# Patient Record
Sex: MALE | Race: OTHER | NOT HISPANIC OR LATINO
[De-identification: names, ages, dates, MRNs, and addresses within clinical notes are randomized per-mention and may not be internally consistent; named-entity substitution may affect disease eponyms.]

---

## 2019-09-20 PROBLEM — Z00.00 ENCOUNTER FOR PREVENTIVE HEALTH EXAMINATION: Status: ACTIVE | Noted: 2019-09-20

## 2019-09-26 ENCOUNTER — APPOINTMENT (OUTPATIENT)
Dept: GASTROENTEROLOGY | Facility: CLINIC | Age: 49
End: 2019-09-26

## 2019-11-25 ENCOUNTER — APPOINTMENT (OUTPATIENT)
Dept: GASTROENTEROLOGY | Facility: CLINIC | Age: 49
End: 2019-11-25

## 2020-05-22 ENCOUNTER — EMERGENCY (EMERGENCY)
Facility: HOSPITAL | Age: 50
LOS: 1 days | Discharge: HOME | End: 2020-05-22
Attending: EMERGENCY MEDICINE | Admitting: EMERGENCY MEDICINE
Payer: COMMERCIAL

## 2020-05-22 VITALS
DIASTOLIC BLOOD PRESSURE: 78 MMHG | SYSTOLIC BLOOD PRESSURE: 125 MMHG | OXYGEN SATURATION: 97 % | TEMPERATURE: 98 F | RESPIRATION RATE: 16 BRPM | HEART RATE: 70 BPM

## 2020-05-22 VITALS
DIASTOLIC BLOOD PRESSURE: 93 MMHG | HEIGHT: 74 IN | WEIGHT: 210.1 LBS | TEMPERATURE: 98 F | RESPIRATION RATE: 18 BRPM | HEART RATE: 88 BPM | SYSTOLIC BLOOD PRESSURE: 145 MMHG | OXYGEN SATURATION: 95 %

## 2020-05-22 LAB
ALBUMIN SERPL ELPH-MCNC: 4.4 G/DL — SIGNIFICANT CHANGE UP (ref 3.3–5)
ALP SERPL-CCNC: 43 U/L — SIGNIFICANT CHANGE UP (ref 40–120)
ALT FLD-CCNC: 28 U/L — SIGNIFICANT CHANGE UP (ref 10–45)
ANION GAP SERPL CALC-SCNC: 11 MMOL/L — SIGNIFICANT CHANGE UP (ref 5–17)
APTT BLD: 25.6 SEC — LOW (ref 27.5–36.3)
AST SERPL-CCNC: 25 U/L — SIGNIFICANT CHANGE UP (ref 10–40)
BASOPHILS # BLD AUTO: 0.02 K/UL — SIGNIFICANT CHANGE UP (ref 0–0.2)
BASOPHILS NFR BLD AUTO: 0.3 % — SIGNIFICANT CHANGE UP (ref 0–2)
BILIRUB SERPL-MCNC: 1 MG/DL — SIGNIFICANT CHANGE UP (ref 0.2–1.2)
BUN SERPL-MCNC: 16 MG/DL — SIGNIFICANT CHANGE UP (ref 7–23)
CALCIUM SERPL-MCNC: 9.6 MG/DL — SIGNIFICANT CHANGE UP (ref 8.4–10.5)
CHLORIDE SERPL-SCNC: 100 MMOL/L — SIGNIFICANT CHANGE UP (ref 96–108)
CO2 SERPL-SCNC: 29 MMOL/L — SIGNIFICANT CHANGE UP (ref 22–31)
CREAT SERPL-MCNC: 1.17 MG/DL — SIGNIFICANT CHANGE UP (ref 0.5–1.3)
EOSINOPHIL # BLD AUTO: 0.02 K/UL — SIGNIFICANT CHANGE UP (ref 0–0.5)
EOSINOPHIL NFR BLD AUTO: 0.3 % — SIGNIFICANT CHANGE UP (ref 0–6)
GLUCOSE SERPL-MCNC: 143 MG/DL — HIGH (ref 70–99)
HCT VFR BLD CALC: 46.1 % — SIGNIFICANT CHANGE UP (ref 39–50)
HGB BLD-MCNC: 14.6 G/DL — SIGNIFICANT CHANGE UP (ref 13–17)
IMM GRANULOCYTES NFR BLD AUTO: 0.2 % — SIGNIFICANT CHANGE UP (ref 0–1.5)
INR BLD: 1.03 — SIGNIFICANT CHANGE UP (ref 0.88–1.16)
LYMPHOCYTES # BLD AUTO: 1.8 K/UL — SIGNIFICANT CHANGE UP (ref 1–3.3)
LYMPHOCYTES # BLD AUTO: 29.5 % — SIGNIFICANT CHANGE UP (ref 13–44)
MCHC RBC-ENTMCNC: 27.3 PG — SIGNIFICANT CHANGE UP (ref 27–34)
MCHC RBC-ENTMCNC: 31.7 GM/DL — LOW (ref 32–36)
MCV RBC AUTO: 86.3 FL — SIGNIFICANT CHANGE UP (ref 80–100)
MONOCYTES # BLD AUTO: 0.7 K/UL — SIGNIFICANT CHANGE UP (ref 0–0.9)
MONOCYTES NFR BLD AUTO: 11.5 % — SIGNIFICANT CHANGE UP (ref 2–14)
NEUTROPHILS # BLD AUTO: 3.55 K/UL — SIGNIFICANT CHANGE UP (ref 1.8–7.4)
NEUTROPHILS NFR BLD AUTO: 58.2 % — SIGNIFICANT CHANGE UP (ref 43–77)
NRBC # BLD: 0 /100 WBCS — SIGNIFICANT CHANGE UP (ref 0–0)
PLATELET # BLD AUTO: 223 K/UL — SIGNIFICANT CHANGE UP (ref 150–400)
POTASSIUM SERPL-MCNC: 3.9 MMOL/L — SIGNIFICANT CHANGE UP (ref 3.5–5.3)
POTASSIUM SERPL-SCNC: 3.9 MMOL/L — SIGNIFICANT CHANGE UP (ref 3.5–5.3)
PROT SERPL-MCNC: 7.2 G/DL — SIGNIFICANT CHANGE UP (ref 6–8.3)
PROTHROM AB SERPL-ACNC: 11.8 SEC — SIGNIFICANT CHANGE UP (ref 10–12.9)
RBC # BLD: 5.34 M/UL — SIGNIFICANT CHANGE UP (ref 4.2–5.8)
RBC # FLD: 12.2 % — SIGNIFICANT CHANGE UP (ref 10.3–14.5)
SARS-COV-2 RNA SPEC QL NAA+PROBE: SIGNIFICANT CHANGE UP
SODIUM SERPL-SCNC: 140 MMOL/L — SIGNIFICANT CHANGE UP (ref 135–145)
WBC # BLD: 6.1 K/UL — SIGNIFICANT CHANGE UP (ref 3.8–10.5)
WBC # FLD AUTO: 6.1 K/UL — SIGNIFICANT CHANGE UP (ref 3.8–10.5)

## 2020-05-22 PROCEDURE — 85610 PROTHROMBIN TIME: CPT

## 2020-05-22 PROCEDURE — 99291 CRITICAL CARE FIRST HOUR: CPT

## 2020-05-22 PROCEDURE — 85730 THROMBOPLASTIN TIME PARTIAL: CPT

## 2020-05-22 PROCEDURE — 80053 COMPREHEN METABOLIC PANEL: CPT

## 2020-05-22 PROCEDURE — 93005 ELECTROCARDIOGRAM TRACING: CPT

## 2020-05-22 PROCEDURE — 70496 CT ANGIOGRAPHY HEAD: CPT | Mod: 26

## 2020-05-22 PROCEDURE — 70498 CT ANGIOGRAPHY NECK: CPT | Mod: 26

## 2020-05-22 PROCEDURE — 99291 CRITICAL CARE FIRST HOUR: CPT | Mod: 25

## 2020-05-22 PROCEDURE — 36415 COLL VENOUS BLD VENIPUNCTURE: CPT

## 2020-05-22 PROCEDURE — 82962 GLUCOSE BLOOD TEST: CPT

## 2020-05-22 PROCEDURE — 0042T: CPT

## 2020-05-22 PROCEDURE — 70450 CT HEAD/BRAIN W/O DYE: CPT | Mod: 26,59

## 2020-05-22 PROCEDURE — 70551 MRI BRAIN STEM W/O DYE: CPT | Mod: 26

## 2020-05-22 PROCEDURE — U0003: CPT

## 2020-05-22 PROCEDURE — 93010 ELECTROCARDIOGRAM REPORT: CPT

## 2020-05-22 PROCEDURE — 87635 SARS-COV-2 COVID-19 AMP PRB: CPT

## 2020-05-22 PROCEDURE — 85025 COMPLETE CBC W/AUTO DIFF WBC: CPT

## 2020-05-22 PROCEDURE — 99284 EMERGENCY DEPT VISIT MOD MDM: CPT

## 2020-05-22 RX ORDER — DIAZEPAM 5 MG
5 TABLET ORAL ONCE
Refills: 0 | Status: DISCONTINUED | OUTPATIENT
Start: 2020-05-22 | End: 2020-05-22

## 2020-05-22 RX ORDER — DIAZEPAM 5 MG
1 TABLET ORAL
Qty: 9 | Refills: 0
Start: 2020-05-22 | End: 2020-05-24

## 2020-05-22 RX ORDER — MECLIZINE HCL 12.5 MG
25 TABLET ORAL ONCE
Refills: 0 | Status: COMPLETED | OUTPATIENT
Start: 2020-05-22 | End: 2020-05-22

## 2020-05-22 RX ORDER — SODIUM CHLORIDE 9 MG/ML
1000 INJECTION INTRAMUSCULAR; INTRAVENOUS; SUBCUTANEOUS ONCE
Refills: 0 | Status: COMPLETED | OUTPATIENT
Start: 2020-05-22 | End: 2020-05-22

## 2020-05-22 RX ORDER — MECLIZINE HCL 12.5 MG
25 TABLET ORAL ONCE
Refills: 0 | Status: DISCONTINUED | OUTPATIENT
Start: 2020-05-22 | End: 2020-05-22

## 2020-05-22 RX ORDER — MECLIZINE HCL 12.5 MG
1 TABLET ORAL
Qty: 21 | Refills: 0
Start: 2020-05-22 | End: 2020-05-28

## 2020-05-22 RX ADMIN — Medication 25 MILLIGRAM(S): at 12:55

## 2020-05-22 RX ADMIN — SODIUM CHLORIDE 1000 MILLILITER(S): 9 INJECTION INTRAMUSCULAR; INTRAVENOUS; SUBCUTANEOUS at 11:01

## 2020-05-22 RX ADMIN — Medication 5 MILLIGRAM(S): at 11:01

## 2020-05-22 NOTE — ED PROVIDER NOTE - CLINICAL SUMMARY MEDICAL DECISION MAKING FREE TEXT BOX
51 y/o M without PMHx presenting to the ED with c/o room-spinning dizziness and off-balance since 12PM yesterday with associated nausea, vomiting. Pt /93, generally well-appearing. Neuro exam normal except for unsteady gait and swaying during pronator drift test. Stroke code called at 10:17.

## 2020-05-22 NOTE — ED ADULT NURSE NOTE - OBJECTIVE STATEMENT
Pt c/o room spinning sensation that started 12pm yesterday while he was driving in the car w/ associated nausea that worsened this morning.  +ataxia on arrival.  Dizziness relieved when he is laying down.  Speaking clearly and coherently in full sentences.  Denies vision changes, head injury / trauma, chest pain, sob, cough, fever, chills, numbness.  Fs 143 on arrival.  Patient verbalized he is not on any medication and is otherwise healthy.  Stroke code initiated.  Team at bedside.  Cardiac monitoring, Neurochecks in place.  Patient in no acute distress.  Continue to monitor.

## 2020-05-22 NOTE — ED ADULT TRIAGE NOTE - AS TEMP SITE
Quality 110: Preventive Care And Screening: Influenza Immunization: Influenza Immunization not Administered because Patient Refused. oral Quality 130: Documentation Of Current Medications In The Medical Record: Current Medications Documented Detail Level: Detailed Quality 402: Tobacco Use And Help With Quitting Among Adolescents: Patient screened for tobacco and never smoked

## 2020-05-22 NOTE — ED ADULT NURSE REASSESSMENT NOTE - NS ED NURSE REASSESS COMMENT FT1
Patient verbalized he is feeling better, ambulated with steady gait to use the bathroom.  Pending MRI.  Continue to monitor.

## 2020-05-22 NOTE — ED ADULT NURSE NOTE - CHPI ED NUR SYMPTOMS NEG
no dizziness/no fever/no loss of consciousness/no blurred vision/no nausea/no change in level of consciousness/no confusion/no weakness/no numbness/no vomiting

## 2020-05-22 NOTE — ED ADULT TRIAGE NOTE - CHIEF COMPLAINT QUOTE
Patient complaining of dizziness, worse with movement and laying down, nausea and vomiting since 12PM yesterday.  Patient denies any unilateral weakness, numbness, tingling, SOB, CP. fevers, cough or any other complaints at this time.  No slurred speech, facial droop or neuro deficits noted.

## 2020-05-22 NOTE — ED ADULT NURSE NOTE - NSIMPLEMENTINTERV_GEN_ALL_ED
Implemented All Universal Safety Interventions:  [employer] to call system. Call bell, personal items and telephone within reach. Instruct patient to call for assistance. Room bathroom lighting operational. Non-slip footwear when patient is off stretcher. Physically safe environment: no spills, clutter or unnecessary equipment. Stretcher in lowest position, wheels locked, appropriate side rails in place.

## 2020-05-22 NOTE — CONSULT NOTE ADULT - SUBJECTIVE AND OBJECTIVE BOX
**STROKE CODE CONSULT NOTE**    Last known well time/Time of onset of symptoms: 5/21/2020 at 12pm    HPI: 50y Male with no significant PMH presented to the ED with complaint of dizziness and nausea that started yesterday at 12pm while driving, without improvement today. Stroke code was called. The patient states he was driving home yesterday, talking to his daughter when he suddenly became dizzy as if the room was spinning. At the time, wanted to wait to see if the episode passed, however, he woke up this morning with same unimproved dizziness. States that the dizziness improves for a little and comes back which is frequent. Has never experienced this before. Vomited before coming to the ED. Endorses nausea. Denies neck pain or headache. Denies specific weakness, endorses generalized weakness. Denies chest pain, shortness of breath. Denies difficulty with speech, blurry vision, double vision.     T(C): 36.6 (05-22-20 @ 10:04), Max: 36.6 (05-22-20 @ 10:04)  HR: 96 (05-22-20 @ 10:28) (88 - 96)  BP: 168/81 (05-22-20 @ 10:28) (145/93 - 168/81)  RR: 18 (05-22-20 @ 10:28) (18 - 18)  SpO2: 98% (05-22-20 @ 10:28) (95% - 98%)    PAST MEDICAL & SURGICAL HISTORY:  No significant PMH       FAMILY HISTORY:  Unknown       SOCIAL HISTORY:  Denies smoking, drinking, or drug use    ROS:   as per HPI, otherwise negative    MEDICATIONS  (STANDING):    MEDICATIONS  (PRN):    Allergies    No Known Allergies    Intolerances      Vital Signs Last 24 Hrs  T(C): 36.6 (22 May 2020 10:04), Max: 36.6 (22 May 2020 10:04)  T(F): 97.9 (22 May 2020 10:04), Max: 97.9 (22 May 2020 10:04)  HR: 96 (22 May 2020 10:28) (88 - 96)  BP: 168/81 (22 May 2020 10:28) (145/93 - 168/81)  BP(mean): --  RR: 18 (22 May 2020 10:28) (18 - 18)  SpO2: 98% (22 May 2020 10:28) (95% - 98%)    Physical exam:  General: No acute distress, awake and alert    Neurologic:  -Mental status: Awake, alert, oriented to person, place, and time. Speech is fluent with intact naming, repetition, and comprehension, no dysarthria. Recent and remote memory intact. Follows commands. Attention/concentration intact. Fund of knowledge appropriate.  -Cranial nerves:   II: Visual fields are full to finger counting.  III, IV, VI: Extraocular movements are intact without nystagmus (questionable few beats of nystagmus while in CT scanner). Pupils equally round and reactive to light  V:  Facial sensation V1-V3 equal and intact   VII: Face is symmetric with normal eye closure and smile  VIII: Hearing is bilaterally intact to finger rub  IX, X: Uvula is midline and soft palate rises symmetrically  XI: Head turning and shoulder shrug are intact.  XII: Tongue protrudes midline  Motor: Normal bulk and tone. No pronator drift. Strength bilateral upper extremity 5/5, bilateral lower extremities 5/5.  Rapid alternating movements intact and symmetric  Sensation: Intact to light touch bilaterally. No neglect or extinction on double simultaneous testing.  Coordination: No dysmetria on finger-to-nose and heel-to-shin bilaterally  Reflexes: Downgoing toes bilaterally   Gait: Unsteady gait.     NIHSS: 0    Fingerstick Blood Glucose: CAPILLARY BLOOD GLUCOSE  143 (22 May 2020 11:37)      POCT Blood Glucose.: 143 mg/dL (22 May 2020 10:09)    LABS:                        14.6   6.10  )-----------( 223      ( 22 May 2020 10:28 )             46.1     05-22    140  |  100  |  16  ----------------------------<  143<H>  3.9   |  29  |  1.17    Ca    9.6      22 May 2020 10:28    TPro  7.2  /  Alb  4.4  /  TBili  1.0  /  DBili  x   /  AST  25  /  ALT  28  /  AlkPhos  43  05-22    PT/INR - ( 22 May 2020 10:28 )   PT: 11.8 sec;   INR: 1.03          PTT - ( 22 May 2020 10:28 )  PTT:25.6 sec      RADIOLOGY & ADDITIONAL STUDIES:    HCT:  CT Brain Stroke Protocol (05.22.20 @ 10:40) >  IMPRESSION:   No acute intracranial hemorrhage or transcortical infarction.      CTA:  < from: CT Angio Head w/ IV Cont (05.22.20 @ 10:43) >    IMPRESSION: No large vessel occlusion.    Dysplastic appearance of the anterior commuting artery complex with a 2 mm anteriorly directed outpouching which appears to be the origin of the small branch vessel, these findings may represent a dysplastic infundibulum or a small aneurysm for which neurosurgical follow-up should be considered.    CT Angio Neck w/ IV Cont (05.22.20 @ 10:43) >  IMPRESSION: No high-grade stenosis, occlusion or dissection involving the cervical carotid or vertebral arteries.    CTP:    CT Perfusion w/ Maps w/ IV Cont (05.22.20 @ 10:42) >  IMPRESSION:   Negative CT perfusion study.    -----------------------------------------------------------------------------------------------------------------  IV-tPA (Y/N):    no                            Bolus time:  Reason IV-tPA not given: out of the window    ASSESSMENT/PLAN:    50y Male w/ no significant PMH presented to the ED with dizziness, nausea and unsteady gait that started yesterday at 12pm. Endorses nausea and waxing and waning dizziness associated with increased movement.  CTH negative.  CTA negative for large vessel occlusion, incidental dinging of dysplastic infundibulum or small aneurysm of the anterior communicating artery complex. . Given that patient was out of the window, tPA was not given. Barrie Halpike maneuver was performed and did not provoke nystagmus. Non focal neurological exam. Although it is possible the etiology is related to peripheral vertigo, posterior circulation stroke should be ruled out with MRI without contrast.     - treat symptomatically for dizziness and nausea in ED, rec low dose of Valium   - MRI without contrast (short stroke protocol) to rule out stroke  - Please call neurology when MRI brain results are back

## 2020-05-22 NOTE — ED PROVIDER NOTE - PROGRESS NOTE DETAILS
CT/CTA/CT perfusion neg for acute pathology. There is an origin of a vessel from the anteiror communicating artery which could be an origin or small aneurysm. Will refer to neurosgy. MRI neg for acute pathology, +white matter disease, spoke with stroke  team, okay for discharge with f/u. Results discussed with patient. Pt feeling improvement of dizziness.

## 2020-05-22 NOTE — ED PROVIDER NOTE - PROVIDER TOKENS
PROVIDER:[TOKEN:[[id number]]],PROVIDER:[TOKEN:[[id number]]],PROVIDER:[TOKEN:[[id number]]],PROVIDER:[TOKEN:[[id number]]],PROVIDER:[TOKEN:[[id number]]]

## 2020-05-22 NOTE — ED PROVIDER NOTE - PHYSICAL EXAMINATION
CONSTITUTIONAL: Well-appearing; well-nourished; in no apparent distress.   HEAD: Normocephalic; atraumatic.   EYES: PERRL; EOM intact; conjunctiva and sclera clear  ENT: normal nose; no rhinorrhea; normal pharynx with no erythema or lesions.   NECK: Supple; non-tender; no LAD  CARDIOVASCULAR: Normal S1, S2; no murmurs, rubs, or gallops. Regular rate and rhythm.   RESPIRATORY: Breathing easily; breath sounds clear and equal bilaterally; no wheezes, rhonchi, or rales.  GI: Soft; non-distended; non-tender; no palpable organomegaly.   MSK: FROM at all extremities, normal tone   EXT: No cyanosis or edema; N/V intact  SKIN: Normal for age and race; warm; dry; good turgor; no apparent lesions or rash.   NEURO: AAO x 3, CN II-XII intact, normal speech, strength 5/5 bilateral upper and lower extremities, sensation intact, cerebellum intact,  follows commands appropriately. Pt sways during pronator drift and has unsteady gait.   PSYCHOLOGICAL: The patient’s mood and manner are appropriate. CONSTITUTIONAL: Well-appearing; well-nourished; in no apparent distress.   HEAD: Normocephalic; atraumatic.   EYES: PERRL; EOM intact; conjunctiva and sclera clear; no nystagmus   ENT: normal nose; no rhinorrhea; normal pharynx with no erythema or lesions.   NECK: Supple; non-tender; no LAD  CARDIOVASCULAR: Normal S1, S2; no murmurs, rubs, or gallops. Regular rate and rhythm.   RESPIRATORY: Breathing easily; breath sounds clear and equal bilaterally; no wheezes, rhonchi, or rales.  GI: Soft; non-distended; non-tender; no palpable organomegaly.   MSK: FROM at all extremities, normal tone   EXT: No cyanosis or edema; N/V intact  SKIN: Normal for age and race; warm; dry; good turgor; no apparent lesions or rash.   NEURO: AAO x 3, CN II-XII intact, normal speech, strength 5/5 bilateral upper and lower extremities, sensation intact, cerebellum intact,  follows commands appropriately. Pt sways during pronator drift and has unsteady gait.   PSYCHOLOGICAL: The patient’s mood and manner are appropriate.

## 2020-05-22 NOTE — ED PROVIDER NOTE - CARE PROVIDER_API CALL
Earle Fowler  NEUROLOGY  [address] [address]  Phone: [phone]  Fax: [phone]  Follow Up Time:     Mulu Zendejas)  Neurology; Vascular Neurology  [address], [address] [place] [address]  Phone: [phone]  Fax: [phone]  Follow Up Time:     Lynnette Jack  NEUROLOGY  [address] [address]  Phone: [phone]  Fax: [phone]  Follow Up Time:     Alexandro Sapp  NEUROSURGERY  [address], [address] [place] [address]  Phone: [phone]  Fax: [phone]  Follow Up Time:     Fabio Bell  NEUROSURGERY  [address] [address]  Phone: [phone]  Fax: [phone]  Follow Up Time:

## 2020-05-22 NOTE — ED PROVIDER NOTE - ATTENDING CONTRIBUTION TO CARE
I discussed the plan of care of the patient directly with the PA and examined the patient while in the Emergency Department. I agree with the HPI and PE as documented by the PA.  Pt is a 49yo m, no pmh, who p/w c/o dizziness described as room spinning while driving car yesterday at 12p. Sx's worse with movement and better when lying supine. + associated n/v. No ha, vision changes, focal weakness/ numbness, speech changes. + difficulty with walking straight. Afebrile. HDS. WNWD m in nad, anxious. + s1, s2, rrr. Lungs cta b/l. Pt is a&o x 3, CN grossly intact. Motor/ sensation intact and equal b/l. Neg pronator drift. + ataxia. FS wnl. Stroke code activated after evaluation by PA. CT head neg for acute hem/ infarct. Awaiting cta head/ neck and ct perf results. Pt currently being evaluated by Dr. Fowler; will await recommendations.

## 2020-05-22 NOTE — ED PROVIDER NOTE - NSFOLLOWUPINSTRUCTIONS_ED_ALL_ED_FT
Please follow up with neurology and neurosurgery. Your CT scan shows that you may have a small aneurysm (buldging of a vessel wall) which you will need to have followed with repeat scans.     Vertigo    WHAT YOU NEED TO KNOW:    Vertigo is a condition that causes you to feel dizzy. You may feel that you or everything around you is moving or spinning. You may also feel like you are being pulled down or toward your side.     DISCHARGE INSTRUCTIONS:    Return to the emergency department if:     You have a headache and a stiff neck.      You have shaking chills and a fever.       You vomit over and over with no relief.       You have blood, pus, or fluid coming out of your ears.      You are confused.     Contact your healthcare provider if:     Your symptoms do not get better with treatment.       You have questions about your condition or care.    Medicines:     Medicine may be given to help relieve your symptoms.      Take your medicine as directed. Contact your healthcare provider if you think your medicine is not helping or if you have side effects. Tell him or her if you are allergic to any medicine. Keep a list of the medicines, vitamins, and herbs you take. Include the amounts, and when and why you take them. Bring the list or the pill bottles to follow-up visits. Carry your medicine list with you in case of an emergency.    Manage your symptoms:     Do not drive, walk without help, or operate heavy machinery when you are dizzy.       Move slowly when you move from one position to another position. Get up slowly from sitting or lying down. Sit or lie down right away if you feel dizzy.      Drink plenty of liquids. Liquids help prevent dehydration. Ask how much liquid to drink each day and which liquids are best for you.      Vestibular and balance rehabilitation therapy (VBRT) is used to teach you exercises to improve your balance and strength. These exercises may help decrease your vertigo and improve your balance. Ask for more information about this therapy.    Follow up with your healthcare provider as directed: Write down your questions so you remember to ask them during your visits.

## 2020-05-22 NOTE — ED PROVIDER NOTE - PATIENT PORTAL LINK FT
You can access the FollowMyHealth Patient Portal offered by [facility] by registering at the following website: [email or web address]. By joining [employer]’s FollowMyHealth portal, you will also be able to view your health information using other applications (apps) compatible with our system.

## 2020-05-22 NOTE — CONSULT NOTE ADULT - ATTENDING COMMENTS
Repetitive / fluctuating episodes of vertigo since yesterday  Neurologic exam unremarkable  No nystagmus and Barrie-hallpike negative b/l  CT head / CTA / CT perfusion unremarkable  Awaiting MRI to r/o small posterior circulation stroke  If negative can d/c home with valium  If positive admit to neurology for stroke workup

## 2020-05-22 NOTE — ED PROVIDER NOTE - OBJECTIVE STATEMENT
51 y/o M without PMHx presenting with c/o dizziness and off-balance since 12PM yesterday. Pt reports dizziness to be constant but worsened when trying to stand and head movements. Pt also endorsing associated nausea, vomiting and generalized weakness. Denies any headache, neck pain, visual changes, focal weaknesses, numbness or tingling. Pt has never had anything like this happen before.

## 2020-05-22 NOTE — ED PROVIDER NOTE - CARE PROVIDERS DIRECT ADDRESSES
[email or web address],[email or web address]

## 2020-05-24 LAB — SARS-COV-2 RNA SPEC QL NAA+PROBE: SIGNIFICANT CHANGE UP

## 2020-05-26 ENCOUNTER — APPOINTMENT (OUTPATIENT)
Dept: NEUROLOGY | Facility: CLINIC | Age: 50
End: 2020-05-26
Payer: MEDICAID

## 2020-05-26 DIAGNOSIS — Z11.9 ENCOUNTER FOR SCREENING FOR INFECTIOUS AND PARASITIC DISEASES, UNSPECIFIED: ICD-10-CM

## 2020-05-26 DIAGNOSIS — R42 DIZZINESS AND GIDDINESS: ICD-10-CM

## 2020-05-26 DIAGNOSIS — Z82.3 FAMILY HISTORY OF STROKE: ICD-10-CM

## 2020-05-26 PROBLEM — Z78.9 OTHER SPECIFIED HEALTH STATUS: Chronic | Status: ACTIVE | Noted: 2020-05-22

## 2020-05-26 PROCEDURE — 99204 OFFICE O/P NEW MOD 45 MIN: CPT | Mod: 95

## 2020-05-26 RX ORDER — MECLIZINE HYDROCHLORIDE 12.5 MG/1
12.5 TABLET ORAL
Refills: 0 | Status: ACTIVE | COMMUNITY

## 2020-05-29 ENCOUNTER — APPOINTMENT (OUTPATIENT)
Dept: OTOLARYNGOLOGY | Facility: CLINIC | Age: 50
End: 2020-05-29
Payer: MEDICAID

## 2020-05-29 VITALS
BODY MASS INDEX: 29.72 KG/M2 | SYSTOLIC BLOOD PRESSURE: 116 MMHG | DIASTOLIC BLOOD PRESSURE: 81 MMHG | HEART RATE: 89 BPM | WEIGHT: 231.6 LBS | HEIGHT: 74 IN

## 2020-05-29 DIAGNOSIS — R26.89 OTHER ABNORMALITIES OF GAIT AND MOBILITY: ICD-10-CM

## 2020-05-29 PROCEDURE — 99204 OFFICE O/P NEW MOD 45 MIN: CPT

## 2020-05-29 NOTE — REVIEW OF SYSTEMS
[Patient Intake Form Reviewed] : Patient intake form was reviewed [As Noted in HPI] : as noted in HPI [Negative] : Head and Neck [FreeTextEntry1] : all other ROS negative

## 2020-05-29 NOTE — HISTORY OF PRESENT ILLNESS
[de-identified] : 50M who presents after visiting the [facility] ER for dizzienss.  Patient reports three weeks ago he was using Qtips when he got blood on the Q tip that resolved on its own (no other symptoms at that time).  Subsequently 1 week ago, while driving, he developed room-spinning dizziness which forced him to pull the car over and get a cab. He had associated N/V at that time and this continued into the next day.   He admits to occasional constant nonpulsatile, high pitched tinnitus, that he did note during the time of vertigo.  He denies any hearing changes, otalgia, otorrhea.  \par \par The dizziness persisted to the next day at which point he decided to go to [facility] ER. There, he had CT and MRI head showed chronic basal ganglia lacunar infarct, but no ICH or other acute process. He was given Meclizine with significant improvement and decided to go home. He then followed up with Dr. Zendejas from  neurology who referred him to ENT to evaluate bloody otorrhea/tinnitus as well as vestibular therapy.  He denies any f/c/s, URI preceding the dizziness.  No other ENT complaints. No pertinent FH/SH.

## 2020-05-29 NOTE — DATA REVIEWED
[de-identified] : CT brain 5/22 -\par IMPRESSION: No acute intracranial hemorrhage or transcortical infarction. \par CTA neck/head 5/22 - \par IMPRESSION: No large vessel occlusion. \par Dysplastic appearance of the anterior commuting artery complex with a 2 mm \par anteriorly directed outpouching which appears to be the origin of the small \par branch vessel, these findings may represent a dysplastic infundibulum or a \par small aneurysm for which neurosurgical follow-up should be considered. \par IMPRESSION: No high-grade stenosis, occlusion or dissection involving the \par cervical carotid or vertebral arteries. \par MRI 5/22- \par Impression: Few left basal ganglia chronic lacunar infarctions. Tiny left \par subacute insular chronic lacunar infarct. Nonspecific mildly patchy and \par punctate foci of T2 and Flair signal hyperintensities within the white \par matter; findings may be seen in patient with migraine headaches, vasculitis, \par microvascular disease, demyelinating disease, Lyme disease and viral \par illness. No evidence of acute infarction.

## 2020-05-29 NOTE — PHYSICAL EXAM
[Midline] : trachea located in midline position [Normal] : no rashes [de-identified] : scant L EAC dried blood [] : Sherwood-Hallpike test is negative [de-identified] : unsteady tandem gait

## 2020-05-29 NOTE — ASSESSMENT
[FreeTextEntry1] : 50M who presents for evaluation of dizziness and tinnitus.  Imaging does not appear to be inner ear related.  Unable to illicit vertiginous symptoms with Barrie-hallpike, tandem-gait appears to be unsteady.  At this point I would recommend an audio/tymp for the tinnitus as well as VNG to see if there is an inner ear deficit.  Information was given regarding vestibular testing and basic BPPV info.  Pt to follow up after testing, sooner should symptoms worsen or fail to improve.  Small aneurysm on imaging is deferred to neurology.  \par \par Plan:\par - VNG\par - audio/tymp\par - Vestibular rehab after work up\par - f/u in 1-2 weeks\par - BPPV sheet given

## 2020-06-01 ENCOUNTER — APPOINTMENT (OUTPATIENT)
Dept: OTOLARYNGOLOGY | Facility: CLINIC | Age: 50
End: 2020-06-01
Payer: MEDICAID

## 2020-06-01 PROCEDURE — 92557 COMPREHENSIVE HEARING TEST: CPT

## 2020-06-01 PROCEDURE — 92550 TYMPANOMETRY & REFLEX THRESH: CPT

## 2020-06-01 NOTE — REVIEW OF SYSTEMS
[As Noted in HPI] : as noted in HPI [Negative] : Heme/Lymph [de-identified] : left shoulder down to his fingers

## 2020-06-01 NOTE — PHYSICAL EXAM
[FreeTextEntry1] : General: sitting in chair, no acute distress\par Eyes: anicteric sclera\par Neck: FROM, recurrence of his symptoms as he turned his head in [place]\par Extremities: FROMx4\par \par Neurologic:\par -Mental status: Awake, alert, oriented to person, place, and time. Speech is fluent with intact naming, repetition, and comprehension, no dysarthria. Recent and remote memory intact. Follows commands. Attention/concentration intact. Fund of knowledge appropriate.\par Cranial nerves: EOMI, no nystagmus, pupils equal, vision full grossly, facial sensation intact, no facial droop (upper and lower intact), shoulder shrug intact bilaterally, tongue midline\par Motor: Normal bulk and tone. No pronator drift. Strength full in bilateral upper and lower extremities\par Sensation: Intact to light touch bilaterally. BORDERLINE Romberg\par Coordination: No dysmetria on finger-to-nose bilaterally\par Gait: steady gait

## 2020-06-01 NOTE — ASSESSMENT
[FreeTextEntry1] : 50 year-old male with no significant PMH presents for evaluation of spinning for the past five days most compatible with peripheral vertigo given his presentation.  \par \par Plan for vertigo - Discussed the anatomy and mechanism of action.\par Agree with Meclizine as needed for acute vertigo.\par He may benefit from antinausea medication if the nausea continues. \par Referral to ENT for further evaluation of his ears\par Recommended Vestibular therapy\par \par Plan for chronic infarcts seen on MRI Brain performed as part of workup for vertigo - \par Agree with Aspirin 81mg for antiplatelet for secondary stroke prevention\par Ordered lipid profile, hemoglobin A1C for further workup for possible risk factors.  Doubt related to family risk factors given the size of the infarcts so no hypercoagulable workup at this time.

## 2020-06-01 NOTE — HISTORY OF PRESENT ILLNESS
[Home] : at home, [unfilled] , at the time of the visit. [Other Location: e.g. Home (Enter Location, City,State)___] : at [unfilled] [Verbal consent obtained from patient] : the patient, [unfilled] [FreeTextEntry1] : 50 year-old male with no significant PMH presents for evaluation of vertigo for the past five days.  He had head spinning with inability to walk straight with vomiting so went to [facility] ER for workup including CT, MRI that were negative for acute infarct.  He has been taking Meclizine three times-a-day with minimal relief.  He still has unsteady gait with shifting to both sides so concerned.  He feels best when he lies in bed.  He loses balance immediately when he stands up.  When he looks down, he feels shifting of momentum.  \par \par No new medications but he remembers having blood coming out of his left ear two weeks ago.  No hearing loss or changes since then.  Some sharp, mechanical sound from his left ear intermittently but only for a few seconds.  \par \par He's eating healthy and exercises.  No history of HTN, hyperlipidemia, DM or cardiac disease.\par SH: never smoker\par FH: paternal grandfather had CVA at age 72, maternal grandmother at age 66

## 2020-06-01 NOTE — DATA REVIEWED
[de-identified] : CT Brain Stroke Protocol (05.22.20 @ 10:40): No acute intracranial hemorrhage or transcortical infarction.\par CT Angio Head and Neck w/ IV Cont (05.22.20 @ 10:43): \par 1. No large vessel occlusion.\par 2. Dysplastic appearance of the anterior commuting artery complex with a 2 mm anteriorly directed outpouching which appears to be the origin of the small branch vessel, these findings may represent a dysplastic infundibulum or a small aneurysm.\par 3. No high-grade stenosis, occlusion or dissection involving the cervical carotid or vertebral arteries.\par \par CT Perfusion w/ Maps w/ IV Cont (05.22.20 @ 10:42): Negative CT perfusion study.\par \par MRI Brain (5/22/2020): Few left basal ganglia chronic lacunar infarctions. Tiny left subacute insular chronic lacunar infarct. Nonspecific mildly patchy and punctate foci of T2 and Flair signal hyperintensities within the white matter; findings may be seen in patient with migraine headaches, vasculitis, microvascular disease, demyelinating disease, Lyme disease and viral illness.  No evidence of acute infarction.\par

## 2020-06-02 ENCOUNTER — APPOINTMENT (OUTPATIENT)
Dept: OTOLARYNGOLOGY | Facility: CLINIC | Age: 50
End: 2020-06-02
Payer: MEDICAID

## 2020-06-02 PROCEDURE — 92540 BASIC VESTIBULAR EVALUATION: CPT

## 2020-06-02 PROCEDURE — 92537 CALORIC VSTBLR TEST W/REC: CPT

## 2020-07-06 ENCOUNTER — APPOINTMENT (OUTPATIENT)
Dept: OTOLARYNGOLOGY | Facility: CLINIC | Age: 50
End: 2020-07-06
Payer: MEDICAID

## 2020-07-06 VITALS
SYSTOLIC BLOOD PRESSURE: 116 MMHG | OXYGEN SATURATION: 98 % | WEIGHT: 230 LBS | HEART RATE: 79 BPM | BODY MASS INDEX: 29.52 KG/M2 | DIASTOLIC BLOOD PRESSURE: 80 MMHG | HEIGHT: 74 IN

## 2020-07-06 DIAGNOSIS — H90.5 UNSPECIFIED SENSORINEURAL HEARING LOSS: ICD-10-CM

## 2020-07-06 PROCEDURE — 99215 OFFICE O/P EST HI 40 MIN: CPT

## 2020-07-06 NOTE — ASSESSMENT
[FreeTextEntry1] : 50M who presents for evaluation of dizziness and tinnitus.  Imaging does not appear to be inner ear related.  Audio/tymp conssistent with a mild asymmetric SNHL on the left.  Pt treated with steroids and has some improvement.  Also being seen at [facility] for vestibular therapy also with improvement.  At this point the pt should finish the steroid taper given by [facility], and we will perform a repeat hearing test at that time.  Differential could include a vestibular neuronitis or labyrinthitis.  If pt still with asymmetry will plan for MRI/IAC w contrast to rule out a retrocochlear process.  Pt will otherwise follow up with Dr. Zendejas of neurology to ensure no central pathology.\par \par \par Plan:\par - repeat audio/tymp after finishing steroid taper.\par - Cont Vestibular rehab\par - follow after testing to review results.\par \par \par \par Law Carolina MD\par Director; [facility]\par Otolaryngology - Head and Neck Surgery\par [facility]\par \par \par Department of Otolaryngology\par [facility]\par \par [address]\par [place], [address]\par [place], [place], [address]\par Office Tel: [phone]

## 2020-07-06 NOTE — HISTORY OF PRESENT ILLNESS
[de-identified] : 50M who presents after visiting the [facility] ER for dizzienss.  Patient reports three weeks ago he was using Qtips when he got blood on the Q tip that resolved on its own (no other symptoms at that time).  Subsequently 1 week ago, while driving, he developed room-spinning dizziness which forced him to pull the car over and get a cab. He had associated N/V at that time and this continued into the next day.   He admits to occasional constant nonpulsatile, high pitched tinnitus, that he did note during the time of vertigo.  He denies any hearing changes, otalgia, otorrhea.  \par \par The dizziness persisted to the next day at which point he decided to go to [facility] ER. There, he had CT and MRI head showed chronic basal ganglia lacunar infarct, but no ICH or other acute process. He was given Meclizine with significant improvement and decided to go home. He then followed up with Dr. Zendejas from  neurology who referred him to ENT to evaluate bloody otorrhea/tinnitus as well as vestibular therapy.  He denies any f/c/s, URI preceding the dizziness.  No other ENT complaints. No pertinent FH/SH.\par - [FreeTextEntry1] : Update 7/6/20\par Overall the patient is stale and doing well.  Since the last visit he did complete an audiogram and tympanogram.  This was consistent with normal hearing on the right and normal to mild sloping SNHL on the left,  tympanogram is type A bilaterally.  Please see full report in the medical record.  He also completed his VNG which did show some left sided horizontal nystagmus which cannot r/o a peripheral pathology and it did not suppress which cannot rule out a central pathology.\par \par The patient did follow up at [place] and has been doing vestibular therapy.  He was also started on a steroid taper as he was seen and evaluated at [facility] presumably for hearing asymmetry.  Overall the patient is active and feels well.  He states that he is 95% better.  Intermittently when he looks up he feels "dizzy" for just a second.

## 2020-07-09 ENCOUNTER — APPOINTMENT (OUTPATIENT)
Dept: OTOLARYNGOLOGY | Facility: CLINIC | Age: 50
End: 2020-07-09
Payer: MEDICAID

## 2020-07-09 ENCOUNTER — APPOINTMENT (OUTPATIENT)
Dept: OTOLARYNGOLOGY | Facility: CLINIC | Age: 50
End: 2020-07-09

## 2020-07-09 PROCEDURE — 92557 COMPREHENSIVE HEARING TEST: CPT

## 2020-07-09 PROCEDURE — 92550 TYMPANOMETRY & REFLEX THRESH: CPT

## 2020-07-21 ENCOUNTER — APPOINTMENT (OUTPATIENT)
Dept: NEUROLOGY | Facility: CLINIC | Age: 50
End: 2020-07-21
Payer: MEDICAID

## 2020-07-21 DIAGNOSIS — Z86.73 PERSONAL HISTORY OF TRANSIENT ISCHEMIC ATTACK (TIA), AND CEREBRAL INFARCTION W/OUT RESIDUAL DEFICITS: ICD-10-CM

## 2020-07-21 DIAGNOSIS — R42 DIZZINESS AND GIDDINESS: ICD-10-CM

## 2020-07-21 PROCEDURE — 99441: CPT

## 2020-07-21 RX ORDER — ASPIRIN ENTERIC COATED TABLETS 81 MG 81 MG/1
81 TABLET, DELAYED RELEASE ORAL
Refills: 0 | Status: ACTIVE | COMMUNITY

## 2020-07-21 NOTE — HISTORY OF PRESENT ILLNESS
[Home] : at home, [unfilled] , at the time of the visit. [Other Location: e.g. Home (Enter Location, City,State)___] : at [unfilled] [Verbal consent obtained from patient] : the patient, [unfilled] [FreeTextEntry1] : 50 year-old male presents for follow up of vertigo since May.  He's feeling great now with resolution of symptoms.  He had vestibular therapy at [place] with improvement.  He also completed steroid dose. \par \par He had lost hearing on the left side including high pitch that has improved on repeat testing.  He was followed by ENT.  \par \par No new weakness, numbness, speech or visual deficits.\par He takes Aspirin daily without side effects.  He had labs at his PMD but doesn't know the results.

## 2020-07-21 NOTE — REVIEW OF SYSTEMS
[Negative] : Endocrine [As Noted in HPI] : as noted in HPI [de-identified] : left shoulder down to his fingers

## 2021-05-07 ENCOUNTER — APPOINTMENT (OUTPATIENT)
Dept: OTOLARYNGOLOGY | Facility: CLINIC | Age: 51
End: 2021-05-07
Payer: MEDICAID

## 2021-05-07 VITALS
HEART RATE: 74 BPM | WEIGHT: 240 LBS | TEMPERATURE: 97.7 F | OXYGEN SATURATION: 98 % | SYSTOLIC BLOOD PRESSURE: 123 MMHG | HEIGHT: 74 IN | DIASTOLIC BLOOD PRESSURE: 77 MMHG | BODY MASS INDEX: 30.8 KG/M2

## 2021-05-07 DIAGNOSIS — H93.13 TINNITUS, BILATERAL: ICD-10-CM

## 2021-05-07 DIAGNOSIS — H90.5 UNSPECIFIED SENSORINEURAL HEARING LOSS: ICD-10-CM

## 2021-05-07 DIAGNOSIS — R42 DIZZINESS AND GIDDINESS: ICD-10-CM

## 2021-05-07 PROCEDURE — 99072 ADDL SUPL MATRL&STAF TM PHE: CPT

## 2021-05-07 PROCEDURE — 99214 OFFICE O/P EST MOD 30 MIN: CPT

## 2021-05-07 NOTE — PHYSICAL EXAM
[Midline] : trachea located in midline position [Normal] : no rashes [] : Enid-Hallpike test is negative

## 2021-05-07 NOTE — ASSESSMENT
[FreeTextEntry1] : 50M who presents for evaluation of dizziness and tinnitus as well as change in hearing.  Given the change in hearing I am recommending audio/tymp.  He will followup in 1 week to review.  \par \par In terms of dizziness we again discussed BPPV and paroxysmal nature. I advised to treat with Epley this we and we can re discuss after audio.  If symptoms still persist may need vestibular rehab again and/or consultation with neurology.\par \par Plan:\par - audio/tymp\par - Epley maneuver\par - f/u in 1 week

## 2021-05-07 NOTE — HISTORY OF PRESENT ILLNESS
[de-identified] : 50M who presents after visiting the [facility] ER for dizzienss. Patient reports three weeks ago he was using Qtips when he got blood on the Q tip that resolved on its own (no other symptoms at that time). Subsequently 1 week ago, while driving, he developed room-spinning dizziness which forced him to pull the car over and get a cab. He had associated N/V at that time and this continued into the next day. He admits to occasional constant nonpulsatile, high pitched tinnitus, that he did note during the time of vertigo. He denies any hearing changes, otalgia, otorrhea. \par \par The dizziness persisted to the next day at which point he decided to go to [facility] ER. There, he had CT and MRI head showed chronic basal ganglia lacunar infarct, but no ICH or other acute process. He was given Meclizine with significant improvement and decided to go home. He then followed up with Dr. Zendejas from  neurology who referred him to ENT to evaluate bloody otorrhea/tinnitus as well as vestibular therapy. He denies any f/c/s, URI preceding the dizziness. No other ENT complaints. No pertinent FH/SH.\par - \par Interval History: Update 7/6/20\par Overall the patient is stale and doing well. Since the last visit he did complete an audiogram and tympanogram. This was consistent with normal hearing on the right and normal to mild sloping SNHL on the left, tympanogram is type A bilaterally. Please see full report in the medical record. He also completed his VNG which did show some left sided horizontal nystagmus which cannot r/o a peripheral pathology and it did not suppress which cannot rule out a central pathology.\par \par The patient did follow up at [place] and has been doing vestibular therapy. He was also started on a steroid taper as he was seen and evaluated at [facility] presumably for hearing asymmetry. Overall the patient is active and feels well. He states that he is 95% better. Intermittently when he looks up he feels "dizzy" for just a second.\par -  [FreeTextEntry1] : Update 5/7/21\par Patient reports he had another episode of room-spinning this morning. He tried Eply maneuver without improvement. He also reports 3 weeks of decreased left hearing and persistence of his previous bilateral tinnitus. No other ENT complaints.

## 2023-02-08 NOTE — ASSESSMENT
I attempted to call patient daughter to reschedule   Left message w/ CB# to call to reschedule [FreeTextEntry1] : 50 year-old male with PMH chronic infarct on MRI presents for followup of spinning compatible with peripheral vertigo.  The vertigo and hearing loss have resolved.  No new stroke symptoms.\par \par Plan for chronic infarcts seen on MRI Brain performed as part of workup for vertigo - \par 1) Continue Aspirin 81mg for antiplatelet for secondary stroke prevention.\par 2) He will have his PMD, Dr. Taylor, fax the results from recent labs to my office.  Will want to follow lipid profile, hemoglobin A1C over time.  \par \par Follow up in office upon return to [place].